# Patient Record
Sex: FEMALE | Race: WHITE | NOT HISPANIC OR LATINO | Employment: PART TIME | URBAN - METROPOLITAN AREA
[De-identification: names, ages, dates, MRNs, and addresses within clinical notes are randomized per-mention and may not be internally consistent; named-entity substitution may affect disease eponyms.]

---

## 2017-03-14 ENCOUNTER — RADIANT APPOINTMENT (OUTPATIENT)
Dept: MAMMOGRAPHY | Facility: CLINIC | Age: 42
End: 2017-03-14
Attending: OBSTETRICS & GYNECOLOGY
Payer: COMMERCIAL

## 2017-03-14 DIAGNOSIS — Z12.31 VISIT FOR SCREENING MAMMOGRAM: ICD-10-CM

## 2017-03-14 PROCEDURE — 77063 BREAST TOMOSYNTHESIS BI: CPT

## 2017-03-14 PROCEDURE — G0202 SCR MAMMO BI INCL CAD: HCPCS

## 2017-07-15 ENCOUNTER — HEALTH MAINTENANCE LETTER (OUTPATIENT)
Age: 42
End: 2017-07-15

## 2018-04-03 ENCOUNTER — RADIANT APPOINTMENT (OUTPATIENT)
Dept: MAMMOGRAPHY | Facility: CLINIC | Age: 43
End: 2018-04-03
Attending: OBSTETRICS & GYNECOLOGY
Payer: COMMERCIAL

## 2018-04-03 DIAGNOSIS — Z12.31 VISIT FOR SCREENING MAMMOGRAM: ICD-10-CM

## 2018-04-03 PROCEDURE — 77067 SCR MAMMO BI INCL CAD: CPT | Performed by: RADIOLOGY

## 2023-04-09 ENCOUNTER — HEALTH MAINTENANCE LETTER (OUTPATIENT)
Age: 48
End: 2023-04-09

## 2023-05-19 ENCOUNTER — TRANSFERRED RECORDS (OUTPATIENT)
Dept: MULTI SPECIALTY CLINIC | Facility: CLINIC | Age: 48
End: 2023-05-19

## 2023-05-22 ENCOUNTER — TRANSFERRED RECORDS (OUTPATIENT)
Dept: HEALTH INFORMATION MANAGEMENT | Facility: CLINIC | Age: 48
End: 2023-05-22

## 2023-08-18 ENCOUNTER — TRANSFERRED RECORDS (OUTPATIENT)
Dept: HEALTH INFORMATION MANAGEMENT | Facility: CLINIC | Age: 48
End: 2023-08-18

## 2023-09-11 ENCOUNTER — OFFICE VISIT (OUTPATIENT)
Dept: FAMILY MEDICINE | Facility: CLINIC | Age: 48
End: 2023-09-11
Payer: COMMERCIAL

## 2023-09-11 VITALS
SYSTOLIC BLOOD PRESSURE: 125 MMHG | HEART RATE: 92 BPM | OXYGEN SATURATION: 98 % | RESPIRATION RATE: 19 BRPM | DIASTOLIC BLOOD PRESSURE: 85 MMHG | WEIGHT: 171.2 LBS | TEMPERATURE: 98 F | BODY MASS INDEX: 27.51 KG/M2 | HEIGHT: 66 IN

## 2023-09-11 DIAGNOSIS — Z11.4 SCREENING FOR HIV (HUMAN IMMUNODEFICIENCY VIRUS): ICD-10-CM

## 2023-09-11 DIAGNOSIS — Z23 NEED FOR PROPHYLACTIC VACCINATION AND INOCULATION AGAINST INFLUENZA: ICD-10-CM

## 2023-09-11 DIAGNOSIS — J40 BRONCHITIS: ICD-10-CM

## 2023-09-11 DIAGNOSIS — Z11.59 NEED FOR HEPATITIS C SCREENING TEST: ICD-10-CM

## 2023-09-11 DIAGNOSIS — Z12.31 VISIT FOR SCREENING MAMMOGRAM: ICD-10-CM

## 2023-09-11 DIAGNOSIS — Z12.11 SCREEN FOR COLON CANCER: ICD-10-CM

## 2023-09-11 DIAGNOSIS — I77.810 DILATATION OF THORACIC AORTA (H): ICD-10-CM

## 2023-09-11 DIAGNOSIS — Z00.00 ROUTINE GENERAL MEDICAL EXAMINATION AT A HEALTH CARE FACILITY: Primary | ICD-10-CM

## 2023-09-11 DIAGNOSIS — I10 ESSENTIAL HYPERTENSION, BENIGN: ICD-10-CM

## 2023-09-11 DIAGNOSIS — Z12.4 CERVICAL CANCER SCREENING: ICD-10-CM

## 2023-09-11 LAB
ALBUMIN SERPL BCG-MCNC: 4.7 G/DL (ref 3.5–5.2)
ALBUMIN UR-MCNC: NEGATIVE MG/DL
ALP SERPL-CCNC: 71 U/L (ref 35–104)
ALT SERPL W P-5'-P-CCNC: 34 U/L (ref 0–50)
ANION GAP SERPL CALCULATED.3IONS-SCNC: 12 MMOL/L (ref 7–15)
APPEARANCE UR: CLEAR
AST SERPL W P-5'-P-CCNC: 25 U/L (ref 0–45)
BACTERIA #/AREA URNS HPF: ABNORMAL /HPF
BILIRUB SERPL-MCNC: 0.6 MG/DL
BILIRUB UR QL STRIP: NEGATIVE
BUN SERPL-MCNC: 16.5 MG/DL (ref 6–20)
CALCIUM SERPL-MCNC: 9.4 MG/DL (ref 8.6–10)
CHLORIDE SERPL-SCNC: 102 MMOL/L (ref 98–107)
CHOLEST SERPL-MCNC: 203 MG/DL
COLOR UR AUTO: YELLOW
CREAT SERPL-MCNC: 0.67 MG/DL (ref 0.51–0.95)
DEPRECATED HCO3 PLAS-SCNC: 24 MMOL/L (ref 22–29)
EGFRCR SERPLBLD CKD-EPI 2021: >90 ML/MIN/1.73M2
ERYTHROCYTE [DISTWIDTH] IN BLOOD BY AUTOMATED COUNT: 13.1 % (ref 10–15)
GLUCOSE SERPL-MCNC: 98 MG/DL (ref 70–99)
GLUCOSE UR STRIP-MCNC: NEGATIVE MG/DL
HBA1C MFR BLD: 5.5 % (ref 0–5.6)
HCT VFR BLD AUTO: 42.2 % (ref 35–47)
HDLC SERPL-MCNC: 72 MG/DL
HGB BLD-MCNC: 14.2 G/DL (ref 11.7–15.7)
HGB UR QL STRIP: ABNORMAL
KETONES UR STRIP-MCNC: NEGATIVE MG/DL
LDLC SERPL CALC-MCNC: 109 MG/DL
LEUKOCYTE ESTERASE UR QL STRIP: NEGATIVE
MCH RBC QN AUTO: 30.7 PG (ref 26.5–33)
MCHC RBC AUTO-ENTMCNC: 33.6 G/DL (ref 31.5–36.5)
MCV RBC AUTO: 91 FL (ref 78–100)
NITRATE UR QL: NEGATIVE
NONHDLC SERPL-MCNC: 131 MG/DL
PH UR STRIP: 5.5 [PH] (ref 5–7)
PLATELET # BLD AUTO: 327 10E3/UL (ref 150–450)
POTASSIUM SERPL-SCNC: 3.8 MMOL/L (ref 3.4–5.3)
PROT SERPL-MCNC: 7.5 G/DL (ref 6.4–8.3)
RBC # BLD AUTO: 4.62 10E6/UL (ref 3.8–5.2)
RBC #/AREA URNS AUTO: ABNORMAL /HPF
SODIUM SERPL-SCNC: 138 MMOL/L (ref 136–145)
SP GR UR STRIP: 1.01 (ref 1–1.03)
SQUAMOUS #/AREA URNS AUTO: ABNORMAL /LPF
TRIGL SERPL-MCNC: 108 MG/DL
UROBILINOGEN UR STRIP-ACNC: 0.2 E.U./DL
WBC # BLD AUTO: 9.6 10E3/UL (ref 4–11)
WBC #/AREA URNS AUTO: ABNORMAL /HPF

## 2023-09-11 PROCEDURE — 99214 OFFICE O/P EST MOD 30 MIN: CPT | Mod: 25 | Performed by: INTERNAL MEDICINE

## 2023-09-11 PROCEDURE — 85027 COMPLETE CBC AUTOMATED: CPT | Performed by: INTERNAL MEDICINE

## 2023-09-11 PROCEDURE — 80053 COMPREHEN METABOLIC PANEL: CPT | Performed by: INTERNAL MEDICINE

## 2023-09-11 PROCEDURE — 83036 HEMOGLOBIN GLYCOSYLATED A1C: CPT | Performed by: INTERNAL MEDICINE

## 2023-09-11 PROCEDURE — 81001 URINALYSIS AUTO W/SCOPE: CPT | Performed by: INTERNAL MEDICINE

## 2023-09-11 PROCEDURE — 90682 RIV4 VACC RECOMBINANT DNA IM: CPT | Performed by: INTERNAL MEDICINE

## 2023-09-11 PROCEDURE — 80061 LIPID PANEL: CPT | Performed by: INTERNAL MEDICINE

## 2023-09-11 PROCEDURE — 36415 COLL VENOUS BLD VENIPUNCTURE: CPT | Performed by: INTERNAL MEDICINE

## 2023-09-11 PROCEDURE — 99386 PREV VISIT NEW AGE 40-64: CPT | Mod: 25 | Performed by: INTERNAL MEDICINE

## 2023-09-11 PROCEDURE — 90471 IMMUNIZATION ADMIN: CPT | Performed by: INTERNAL MEDICINE

## 2023-09-11 RX ORDER — IRBESARTAN 300 MG/1
300 TABLET ORAL DAILY
Qty: 90 TABLET | Refills: 3 | Status: SHIPPED | OUTPATIENT
Start: 2023-09-11 | End: 2024-06-14

## 2023-09-11 RX ORDER — FLUTICASONE FUROATE 100 UG/1
1 POWDER RESPIRATORY (INHALATION) DAILY
Qty: 30 EACH | Refills: 11 | Status: SHIPPED | OUTPATIENT
Start: 2023-09-11

## 2023-09-11 RX ORDER — IRBESARTAN 300 MG/1
300 TABLET ORAL
COMMUNITY
Start: 2022-11-30 | End: 2023-09-11

## 2023-09-11 RX ORDER — SPIRONOLACTONE 25 MG/1
1 TABLET ORAL DAILY
COMMUNITY
Start: 2023-07-24 | End: 2023-09-11

## 2023-09-11 RX ORDER — ALBUTEROL SULFATE 90 UG/1
2 AEROSOL, METERED RESPIRATORY (INHALATION) EVERY 6 HOURS PRN
Qty: 18 G | Refills: 11 | Status: SHIPPED | OUTPATIENT
Start: 2023-09-11

## 2023-09-11 RX ORDER — NIFEDIPINE 60 MG/1
60 TABLET, EXTENDED RELEASE ORAL DAILY
Qty: 90 TABLET | Refills: 3 | Status: SHIPPED | OUTPATIENT
Start: 2023-09-11 | End: 2023-10-27

## 2023-09-11 RX ORDER — SPIRONOLACTONE 25 MG/1
25 TABLET ORAL DAILY
Qty: 90 TABLET | Refills: 3 | Status: SHIPPED | OUTPATIENT
Start: 2023-09-11 | End: 2024-06-14

## 2023-09-11 ASSESSMENT — ENCOUNTER SYMPTOMS
HEMATURIA: 0
MYALGIAS: 1
HEADACHES: 0
JOINT SWELLING: 1
ARTHRALGIAS: 1
EYE PAIN: 0
BREAST MASS: 0
NAUSEA: 0
DIARRHEA: 0
SORE THROAT: 0
FREQUENCY: 0
DIZZINESS: 0
DYSURIA: 0
COUGH: 0
CHILLS: 0
SHORTNESS OF BREATH: 1
ABDOMINAL PAIN: 0
WEAKNESS: 0
HEMATOCHEZIA: 0
NERVOUS/ANXIOUS: 0
HEARTBURN: 0
FEVER: 0
CONSTIPATION: 0
PALPITATIONS: 1
PARESTHESIAS: 0

## 2023-09-11 ASSESSMENT — PAIN SCALES - GENERAL: PAINLEVEL: NO PAIN (0)

## 2023-09-11 NOTE — PATIENT INSTRUCTIONS
(Z00.00) Routine general medical examination at a health care facility  (primary encounter diagnosis)  Comment: For routine exam, we will draw labs as ordered, cholesterol, diabetes mellitus check, liver function, renal function, and request mammogram -  [facility] (also performs diagnostic mammogram, ultrasound and biopsy) [phone].   We will also update vaccination history.  Plan: Lipid panel reflex to direct LDL Non-fasting,         Lipid panel reflex to direct LDL Fasting,         Comprehensive metabolic panel, CBC with         platelets, Hemoglobin A1c, UA Macroscopic with         reflex to Microscopic and Culture            (Z12.11) Screen for colon cancer  Comment: up to date for colonoscopy  Plan:     (Z11.4) Screening for HIV (human immunodeficiency virus)  Comment: declined  Plan:     (Z11.59) Need for hepatitis C screening test  Comment: declined  Plan:     (Z12.4) Cervical cancer screening  Comment: up to date - [facility]  Plan:     (Z12.31) Visit for screening mammogram  Comment:  [facility] (also performs diagnostic mammogram, ultrasound and biopsy) [phone].   Plan: MA SCREENING DIGITAL BILAT - Future  (s+30)            (J40) Bronchitis  Comment: OK to refill albuterol and will also refill fluticasone  Plan: fluticasone (ARNUITY ELLIPTA) 100 MCG/ACT         inhaler, albuterol (PROAIR HFA/PROVENTIL         HFA/VENTOLIN HFA) 108 (90 Base) MCG/ACT inhaler            (I77.810) Dilatation of thoracic aorta (H)  Comment: Recommend annual echocardiogram [facility] - [phone] and consult with cardiology also placed   Plan: Echocardiogram Complete, Adult Cardiology Irene Pereira Referral            (I10) Essential hypertension, benign  Comment: blood pressure is excellent, but given lower extremity edema we can consider reducing dose of nifedipine.  Alternatively prescription for compression stockings could be offered   Plan:  NIFEdipine ER OSMOTIC (NIFEDICAL XL) 60 MG 24         hr tablet, irbesartan (AVAPRO) 300 MG tablet,         spironolactone (ALDACTONE) 25 MG tablet

## 2023-09-11 NOTE — Clinical Note
Please abstract the following data from this visit with this patient into the appropriate field in Epic:  Tests that can be patient reported without a hard copy:  Colonoscopy done on this date: 5/19/2023 (approximately), by this group: [facility],   Other Tests found in the patient's chart through Chart Review/Care Everywhere:

## 2023-09-11 NOTE — PROGRESS NOTES
SUBJECTIVE:   CC: Nannette is an 48 year old who presents for preventive health visit.       Healthy Habits:     Getting at least 3 servings of Calcium per day:  Yes    Bi-annual eye exam:  NO    Dental care twice a year:  Yes    Sleep apnea or symptoms of sleep apnea:  None    Diet:  Regular (no restrictions), Low salt and Carbohydrate counting    Frequency of exercise:  4-5 days/week    Duration of exercise:  30-45 minutes    Taking medications regularly:  Yes    Medication side effects:  None    Additional concerns today:  No      Today's PHQ-2 Score:       9/11/2023    10:39 AM   PHQ-2 ( 1999 [employer])   Q1: Little interest or pleasure in doing things 0   Q2: Feeling down, depressed or hopeless 0   PHQ-2 Score 0   Q1: Little interest or pleasure in doing things Not at all   Q2: Feeling down, depressed or hopeless Not at all   PHQ-2 Score 0         Have you ever done Advance Care Planning? (For example, a Health Directive, POLST, or a discussion with a medical provider or your loved ones about your wishes): No, advance care planning information given to patient to review.  Patient plans to discuss their wishes with loved ones or provider.      Social History     Tobacco Use    Smoking status: Never    Smokeless tobacco: Not on file   Substance Use Topics    Alcohol use: No             9/11/2023    10:39 AM   Alcohol Use   Prescreen: >3 drinks/day or >7 drinks/week? No          No data to display              Reviewed orders with patient.  Reviewed health maintenance and updated orders accordingly - Yes  Lab work is in process  Labs reviewed in EPIC    Breast Cancer Screening:    FHS-7:       9/11/2023    10:39 AM   Breast CA Risk Assessment (FHS-7)   Did any of your first-degree relatives have breast or ovarian cancer? Yes   Did any of your relatives have bilateral breast cancer? No   Did any man in your family have breast cancer? No   Did any woman in your family have breast and ovarian cancer? Yes   Did any woman  in your family have breast cancer before age 50 y? No   Do you have 2 or more relatives with breast and/or ovarian cancer? No   Do you have 2 or more relatives with breast and/or bowel cancer? No       Pertinent mammograms are reviewed under the imaging tab.    History of abnormal Pap smear: Status post benign hysterectomy. Health Maintenance and Surgical History updated.     Reviewed and updated as needed this visit by clinical staff                  Reviewed and updated as needed this visit by Provider                 No past medical history on file.     Review of Systems   Constitutional:  Negative for chills and fever.   HENT:  Negative for congestion, ear pain, hearing loss and sore throat.    Eyes:  Positive for visual disturbance. Negative for pain.   Respiratory:  Positive for shortness of breath. Negative for cough.    Cardiovascular:  Positive for palpitations and peripheral edema. Negative for chest pain.   Gastrointestinal:  Negative for abdominal pain, constipation, diarrhea, heartburn, hematochezia and nausea.   Breasts:  Positive for tenderness. Negative for breast mass and discharge.   Genitourinary:  Negative for dysuria, frequency, genital sores, hematuria, pelvic pain, urgency, vaginal bleeding and vaginal discharge.   Musculoskeletal:  Positive for arthralgias, joint swelling and myalgias.   Skin:  Negative for rash.   Neurological:  Negative for dizziness, weakness, headaches and paresthesias.   Psychiatric/Behavioral:  Positive for mood changes. The patient is not nervous/anxious.      Ankle edema    Nannette Lisa has been noticing periodic ankle edema with associated discomfort.  Noticed for the past 3-4 months has worsened.  Noticed when she is seated all day.  She did have a recent echocardiogram showing stable cardiac function with mild dilation of the ascending aorta.  She has a family history of this as well.  She has had previous cardiac work-up which has been negative to date.  Screen  "for colon cancer   She is up-to-date with regards to colonoscopy  Screening for HIV (human immunodeficiency virus)  Need for hepatitis C screening test   She declines HIV and hep C screening  Cervical cancer screening   Has had hysterectomy and is no longer due for Pap smear.  Recent follow-up with gynecology  Visit for screening mammogram   Due for mammogram  Bronchitis   She does have known history of asthma and bronchitis.  She has been taking Flovent with some success in managing.  She also has albuterol as needed.  Dilatation of thoracic aorta (H)   As above, history of dilated aorta  Essential hypertension, benign   As above.  Blood pressure has been well controlled with addition of nifedipine.  She wishes to continue this medication with irbesartan and spironolactone.       OBJECTIVE:   /85 (BP Location: Right arm, Patient Position: Sitting, Cuff Size: Adult Regular)   Pulse 92   Temp 98  F (36.7  C) (Oral)   Resp 19   Ht 1.665 m (5' 5.55\")   Wt 77.7 kg (171 lb 3.2 oz)   SpO2 98%   BMI 28.01 kg/m    Physical Exam  GENERAL: healthy, alert and no distress  EYES: Eyes grossly normal to inspection,    HENT: ear canals and TM's normal, nose and mouth without ulcers or lesions  NECK: no adenopathy, no asymmetry, masses, or scars and thyroid normal to palpation  RESP: lungs clear to auscultation - no rales, rhonchi or wheezes  CV: regular rate and rhythm, normal S1 S2, no S3 or S4, no murmur, click or rub, trace peripheral edema    ABDOMEN: soft, nontender, no hepatosplenomegaly, no masses and bowel sounds normal  MS: no gross musculoskeletal defects noted, no edema  SKIN: no suspicious lesions or rashes  NEURO: Normal strength and tone, mentation intact and speech normal  PSYCH: mentation appears normal, affect normal/bright    Diagnostic Test Results:  Labs reviewed in Epic    ASSESSMENT/PLAN:     Patient Instructions   (Z00.00) Routine general medical examination at a health care facility  ([facility] " encounter diagnosis)  Comment: For routine exam, we will draw labs as ordered, cholesterol, diabetes mellitus check, liver function, renal function, and request mammogram -  [facility] (also performs diagnostic mammogram, ultrasound and biopsy) [phone].   We will also update vaccination history.  Plan: Lipid panel reflex to direct LDL Non-fasting,         Lipid panel reflex to direct LDL Fasting,         Comprehensive metabolic panel, CBC with         platelets, Hemoglobin A1c, UA Macroscopic with         reflex to Microscopic and Culture            (Z12.11) Screen for colon cancer  Comment: up to date for colonoscopy  Plan:     (Z11.4) Screening for HIV (human immunodeficiency virus)  Comment: declined  Plan:     (Z11.59) Need for hepatitis C screening test  Comment: declined  Plan:     (Z12.4) Cervical cancer screening  Comment: up to date - [facility]  Plan:     (Z12.31) Visit for screening mammogram  Comment:  [facility] (also performs diagnostic mammogram, ultrasound and biopsy) [phone].   Plan: MA SCREENING DIGITAL BILAT - Future  (s+30)            (J40) Bronchitis  Comment: OK to refill albuterol and will also refill fluticasone  Plan: fluticasone (ARNUITY ELLIPTA) 100 MCG/ACT         inhaler, albuterol (PROAIR HFA/PROVENTIL         HFA/VENTOLIN HFA) 108 (90 Base) MCG/ACT inhaler            (I77.810) Dilatation of thoracic aorta (H)  Comment: Recommend annual echocardiogram [facility] - [phone] and consult with cardiology also placed   Plan: Echocardiogram Complete, Adult Cardiology Irene Pereira Referral            (I10) Essential hypertension, benign  Comment: blood pressure is excellent, but given lower extremity edema we can consider reducing dose of nifedipine.  Alternatively prescription for compression stockings could be offered   Plan: NIFEdipine ER OSMOTIC (NIFEDICAL XL) 60 MG 24         hr tablet, irbesartan (AVAPRO)  300 MG tablet,         spironolactone (ALDACTONE) 25 MG tablet                Patient has been advised of split billing requirements and indicates understanding: Yes      COUNSELING:  Reviewed preventive health counseling, as reflected in patient instructions        She reports that she has never smoked. She does not have any smokeless tobacco history on file.          Emerson Carranza MD, MD  [facility]

## 2023-09-13 NOTE — RESULT ENCOUNTER NOTE
Tee Brunson,    I had the opportunity to review your recent labs and a summary of your labs reads as follows:    Your complete blood counts show no sign of anemia, normal white blood cell count and platelets.  Your comprehensive metabolic panel showed normal renal function, normal liver function, and normal fasting blood glucose indicating no evidence of diabetes mellitus.  Your A1c also shows excellent average blood glucose  Your fasting lipid panel show  - normal HDL (good) cholesterol -as your goal is greater than 40  - low LDL (bad) cholesterol as your goal is less than 130  - normal triglyceride levels  Your urinalysis shows moderate blood, and there are many possible explanations for this, I recommend repeating your urinalysis at your convenience    The 10-year ASCVD risk score (Miguel DK, et al., 2019) is: 1.2%    Values used to calculate the score:      Age: 48 years      Sex: Female      Is Non- : No      Diabetic: No      Tobacco smoker: No      Systolic Blood Pressure: 125 mmHg      Is BP treated: Yes      HDL Cholesterol: 72 mg/dL      Total Cholesterol: 203 mg/dL      Sincerely,  Emerson Carranza MD

## 2023-09-22 ENCOUNTER — LAB (OUTPATIENT)
Dept: LAB | Facility: CLINIC | Age: 48
End: 2023-09-22
Payer: COMMERCIAL

## 2023-09-22 DIAGNOSIS — Z23 NEED FOR PROPHYLACTIC VACCINATION AND INOCULATION AGAINST INFLUENZA: ICD-10-CM

## 2023-09-22 DIAGNOSIS — I10 ESSENTIAL HYPERTENSION, BENIGN: ICD-10-CM

## 2023-09-22 DIAGNOSIS — Z11.4 SCREENING FOR HIV (HUMAN IMMUNODEFICIENCY VIRUS): ICD-10-CM

## 2023-09-22 DIAGNOSIS — Z12.11 SCREEN FOR COLON CANCER: ICD-10-CM

## 2023-09-22 DIAGNOSIS — Z11.59 NEED FOR HEPATITIS C SCREENING TEST: ICD-10-CM

## 2023-09-22 DIAGNOSIS — Z12.4 CERVICAL CANCER SCREENING: ICD-10-CM

## 2023-09-22 DIAGNOSIS — Z12.31 VISIT FOR SCREENING MAMMOGRAM: ICD-10-CM

## 2023-09-22 DIAGNOSIS — I77.810 DILATATION OF THORACIC AORTA (H): ICD-10-CM

## 2023-09-22 DIAGNOSIS — J40 BRONCHITIS: ICD-10-CM

## 2023-09-22 DIAGNOSIS — Z00.00 ROUTINE GENERAL MEDICAL EXAMINATION AT A HEALTH CARE FACILITY: ICD-10-CM

## 2023-09-22 LAB
ALBUMIN UR-MCNC: NEGATIVE MG/DL
APPEARANCE UR: CLEAR
BACTERIA #/AREA URNS HPF: ABNORMAL /HPF
BILIRUB UR QL STRIP: NEGATIVE
COLOR UR AUTO: YELLOW
GLUCOSE UR STRIP-MCNC: NEGATIVE MG/DL
HGB UR QL STRIP: ABNORMAL
KETONES UR STRIP-MCNC: NEGATIVE MG/DL
LEUKOCYTE ESTERASE UR QL STRIP: NEGATIVE
NITRATE UR QL: NEGATIVE
PH UR STRIP: 7 [PH] (ref 5–7)
RBC #/AREA URNS AUTO: ABNORMAL /HPF
SP GR UR STRIP: 1.01 (ref 1–1.03)
SQUAMOUS #/AREA URNS AUTO: ABNORMAL /LPF
UROBILINOGEN UR STRIP-ACNC: 0.2 E.U./DL
WBC #/AREA URNS AUTO: ABNORMAL /HPF

## 2023-09-22 PROCEDURE — 81001 URINALYSIS AUTO W/SCOPE: CPT

## 2023-09-22 NOTE — RESULT ENCOUNTER NOTE
Tee Brunson,    I have had the opportunity to review your recent results and an interpretation is as follows:  Your recheck of your your urinalysis again shows no concerning findings for infection but still with a small amount of blood.  I recommend we consider a consultation with urology to review this        [facility] [address]  [address]  [facility] [address]  Phone: [phone]  Fax: [phone]          Sincerely,  Emerson Carranza MD

## 2023-10-27 ENCOUNTER — OFFICE VISIT (OUTPATIENT)
Dept: CARDIOLOGY | Facility: CLINIC | Age: 48
End: 2023-10-27
Payer: COMMERCIAL

## 2023-10-27 VITALS
SYSTOLIC BLOOD PRESSURE: 136 MMHG | DIASTOLIC BLOOD PRESSURE: 80 MMHG | HEART RATE: 80 BPM | OXYGEN SATURATION: 97 % | BODY MASS INDEX: 28.49 KG/M2 | WEIGHT: 171 LBS | HEIGHT: 65 IN

## 2023-10-27 DIAGNOSIS — I77.810 DILATATION OF THORACIC AORTA (H): ICD-10-CM

## 2023-10-27 DIAGNOSIS — E78.5 DYSLIPIDEMIA: ICD-10-CM

## 2023-10-27 DIAGNOSIS — I1A.0 RESISTANT HYPERTENSION: Primary | ICD-10-CM

## 2023-10-27 PROCEDURE — 99204 OFFICE O/P NEW MOD 45 MIN: CPT | Performed by: INTERNAL MEDICINE

## 2023-10-27 PROCEDURE — 93000 ELECTROCARDIOGRAM COMPLETE: CPT | Performed by: INTERNAL MEDICINE

## 2023-10-27 RX ORDER — AMLODIPINE BESYLATE 10 MG/1
1 TABLET ORAL DAILY
COMMUNITY
Start: 2023-10-06 | End: 2024-06-14

## 2023-10-27 NOTE — PATIENT INSTRUCTIONS
October 27, 2023    Thank you for allowing our Cardiology team to participate in your care.     Please note the following changes to your heart treatment plan:     Medication changes:   - none  - monitor BPs at home, call our team if BPs are consistently >130/80 mmHg    Tests to be done:  - TTE (heart ultrasound) in 1 year  - Added renal artery duplex ultrasounds   - FASTING labs in 1 year    Follow up:  - Follow up in 1 year, or sooner as needed.      For scheduling, please call [phone].      Please contact our team through [employer] or our Nurse Team Voicemail service [phone], or the General Clinic [phone] for any questions or concerns.     If you are having a medical emergency, please call [phone].     Sincerely,    Simón Hampton MD, [facility]  Cardiology    [facility] [place]

## 2023-10-27 NOTE — LETTER
10/27/2023    Robin Guerrero MD  [address] [facility]  [facility] [address]    RE: Nannette Lisa       Dear Colleague,     I had the pleasure of seeing Nannette Lisa in the [facility].      Cardiology Clinic Consultation:    October 27, 2023   Patient Name: Nannette Lisa  Patient MRN: [id number]    Consult indication: dilated aorta    HPI:    I had the opportunity to see patient Nannette Lisa in cardiology clinic for a consultation.     As you know patient is a pleasant 48-year-old female with a past medical history significant for hypertension, asthma, family history of early ASCVD, family history of aortic aneurysm, who presents to [facility] care.    Patient reports that she has had elevated blood pressures dating back to her 20s.  She had been evaluated extensively in the past, details unclear, however she reports that evaluation had been unrevealing.  She does note that she has a strong family history of hypertension.  At baseline she exercises regularly, walks for a couple of miles every day, also engages in light weight lifting.  Denies any chest pain, chest pressure, abnormal shortness of breath.  Regarding family history, her father passed away from complications related to cancer, however had an MI in his 40s, bypass surgery in his 50s.  Her mother has a history of aortic aneurysm, they have been watching this closely with repeat MRA studies every 6 months.  Patient has a longstanding history of challenging to control hypertension, she had been doing well on nifedipine, irbesartan, spironolactone however developed lower extremity swelling.  Nifedipine was then switched to amlodipine.  Since then, blood pressures have been well controlled, she brings with her a log from BP readings at home, numbers are generally in the 120s over 70s mmHg range.    Patient does not smoke cigarettes, does not drink alcohol regularly, does not drink an excessive amount of  caffeine.  Diet is well-rounded, she does not add an excessive amount of salt however does not avoid it either.    She had been experiencing some palpitations and so was assessed through [facility] with a Zio patch monitor that demonstrated sinus rhythm with only rare/occasional ectopy, no significant arrhythmia.  TTE 8/20/2023 reported LVEF 65 to 70%, normal RV function, proximal ascending aorta 4 cm.  CT coronary calcium score 8/13/2019 was 0.    Assessment and Plan/Recommendations:    # Hypertension, longstanding. BP well controlled on current regimen.   # Mildly dilated ascending aorta 4cm on OSH TTE 8/2023  # CAC 0 8/13/2019  # FH of ascending aortic aneurysm  # FH of early ASCVD    -Overall patient is in stable cardiac health without symptoms concerning for angina or decompensated heart failure  - Encouraged continued healthy lifestyle habits including regular aerobic exercise, light weightlifting (avoiding heavy weightlifting due to concern for dilated aorta), heart healthy Mediterranean type diet incorporating olive oil, fiber, lean protein  - Continue current regimen of amlodipine, irbesartan, spironolactone  - Advised to continue monitoring BPs at home, if BPs are consistently over goal of less than 130/80 mmHg, plan will be to start beta-blocker therapy (previously had been on atenolol and this was well-tolerated)  - We will add on renal artery Doppler study to renal ultrasound that has been ordered for work-up of hematuria  - TTE in 1 year  - Fasting lipids and BMP in 1 year with follow-up at that time, or sooner as needed    Thank you for allowing our team to participate in the care of Nannette Lisa.  Please do not hesitate to call or page me with any questions or concerns.    Sincerely,     Simón Hampton MD, [facility]  Cardiology  October 27, 2023    cc  Emerson Carranza MD  [address]  [place] [address]      Total time spent on this encounter today: Greater than  "45 minutes, providing care in this encounter including, but not limited to, reviewing prior medical records, laboratory data, imaging studies, diagnostic studies, procedure notes, formulating an assessment and plan, recommendations, discussion and counseling with patient face to face, dictation.    Past Medical History:     The 10-year ASCVD risk score (Miguel YAO, et al., 2019) is: 1.4%    Values used to calculate the score:      Age: 48 years      Sex: Female      Is Non- : No      Diabetic: No      Tobacco smoker: No      Systolic Blood Pressure: 136 mmHg      Is BP treated: Yes      HDL Cholesterol: 72 mg/dL      Total Cholesterol: 203 mg/dL  Patient Active Problem List   Diagnosis    Dilatation of thoracic aorta (H24)    Essential hypertension, benign    Bronchitis    Asthma    FH: CAD (coronary artery disease)    Lung nodule       Past Surgical History:   Past Surgical History:   Procedure Laterality Date    HYSTERECTOMY, PAP NO LONGER INDICATED         Medications (outpatient):  Current Outpatient Medications   Medication Sig Dispense Refill    albuterol (PROAIR HFA/PROVENTIL HFA/VENTOLIN HFA) 108 (90 Base) MCG/ACT inhaler Inhale 2 puffs into the lungs every 6 hours as needed for shortness of breath, wheezing or cough 18 g 11    amLODIPine (NORVASC) 10 MG tablet Take 1 tablet by mouth daily      fluticasone (ARNUITY ELLIPTA) 100 MCG/ACT inhaler Inhale 1 puff into the lungs daily 30 each 11    irbesartan (AVAPRO) 300 MG tablet Take 1 tablet (300 mg) by mouth daily 90 tablet 3    omeprazole (PRILOSEC) 20 MG DR capsule Take 20 mg by mouth daily      spironolactone (ALDACTONE) 25 MG tablet Take 1 tablet (25 mg) by mouth daily 90 tablet 3       Allergies:  Allergies   Allergen Reactions    Amoxicillin-Pot Clavulanate Diarrhea    Beta Adrenergic Blockers      Fatigue at higher doses    Diltiazem Dizziness     vertigo    Lisinopril      \"felt funny\"       Social History:   History   Drug Use " "Unknown      History   Smoking Status    Never   Smokeless Tobacco    Never     Social History    Substance and Sexual Activity      Alcohol use: No       Family History:  Family History   Problem Relation Age of Onset    Aortic aneurysm Mother     Atrial fibrillation Mother     Ulcerative Colitis Mother     Coronary Artery Disease Father     Cancer Father        Review of Systems:   A comprehensive 12 system review of systems was carried out.  Pertinent positives and negatives are noted above. Otherwise negative for contributory information.    Objective & Physical Exam:  /80 (BP Location: Right arm, Patient Position: Sitting, Cuff Size: Adult Regular)   Pulse 80   Ht 1.651 m (5' 5\")   Wt 77.6 kg (171 lb)   SpO2 97%   BMI 28.46 kg/m    Wt Readings from Last 2 Encounters:   10/27/23 77.6 kg (171 lb)   09/11/23 77.7 kg (171 lb 3.2 oz)     Body mass index is 28.46 kg/m .   Body surface area is 1.89 meters squared.    Constitutional: appears stated age, in no apparent distress, appears to be well nourished  Head: normocephalic, atraumatic  Neck: supple, trachea midline  Pulmonary: clear to auscultation bilaterally, no wheezes, no rales, no increased work of breathing  Cardiovascular: JVP normal, regular rate, regular rhythm, normal S1 and S2, no S3, S4, no murmur appreciated, no lower extremity edema  Gastrointestinal: no guarding, non-rigid   Neurologic: awake, alert, moves all extremities  Skin: no jaundice, warm on limited exam  Psychiatric: affect is normal, answers questions appropriately, oriented to self and place    Data reviewed:  Lab Results   Component Value Date    WBC 9.6 09/11/2023    RBC 4.62 09/11/2023    HGB 14.2 09/11/2023    HCT 42.2 09/11/2023    MCV 91 09/11/2023    MCH 30.7 09/11/2023    MCHC 33.6 09/11/2023    RDW 13.1 09/11/2023     09/11/2023     Sodium   Date Value Ref Range Status   09/11/2023 138 136 - 145 mmol/L Final     Potassium   Date Value Ref Range Status "   09/11/2023 3.8 3.4 - 5.3 mmol/L Final     Chloride   Date Value Ref Range Status   09/11/2023 102 98 - 107 mmol/L Final     Carbon Dioxide (CO2)   Date Value Ref Range Status   09/11/2023 24 22 - 29 mmol/L Final     Anion Gap   Date Value Ref Range Status   09/11/2023 12 7 - 15 mmol/L Final     Glucose   Date Value Ref Range Status   09/11/2023 98 70 - 99 mg/dL Final     Urea Nitrogen   Date Value Ref Range Status   09/11/2023 16.5 6.0 - 20.0 mg/dL Final     Creatinine   Date Value Ref Range Status   09/11/2023 0.67 0.51 - 0.95 mg/dL Final     GFR Estimate   Date Value Ref Range Status   09/11/2023 >90 >60 mL/min/1.73m2 Final     Calcium   Date Value Ref Range Status   09/11/2023 9.4 8.6 - 10.0 mg/dL Final     Bilirubin Total   Date Value Ref Range Status   09/11/2023 0.6 <=1.2 mg/dL Final     Alkaline Phosphatase   Date Value Ref Range Status   09/11/2023 71 35 - 104 U/L Final     ALT   Date Value Ref Range Status   09/11/2023 34 0 - 50 U/L Final     Comment:     Reference intervals for this test were updated on 6/12/2023 to more accurately reflect our healthy population. There may be differences in the flagging of prior results with similar values performed with this method. Interpretation of those prior results can be made in the context of the updated reference intervals.       AST   Date Value Ref Range Status   09/11/2023 25 0 - 45 U/L Final     Comment:     Reference intervals for this test were updated on 6/12/2023 to more accurately reflect our healthy population. There may be differences in the flagging of prior results with similar values performed with this method. Interpretation of those prior results can be made in the context of the updated reference intervals.     Recent Labs   Lab Test 09/11/23  1211   CHOL 203*   HDL 72   *   TRIG 108      Lab Results   Component Value Date    A1C 5.5 09/11/2023          Thank you for allowing me to participate in the care of your patient.      Sincerely,      Simón Hampton MD     [facility]  cc:   Emerson Carranza MD  [address] [address]

## 2023-10-27 NOTE — PROGRESS NOTES
Cardiology Clinic Consultation:    October 27, 2023   Patient Name: Nannette Lisa  Patient MRN: [id number]    Consult indication: dilated aorta    HPI:    I had the opportunity to see patient Nannette Lisa in cardiology clinic for a consultation.     As you know patient is a pleasant 48-year-old female with a past medical history significant for hypertension, asthma, family history of early ASCVD, family history of aortic aneurysm, who presents to [facility] care.    Patient reports that she has had elevated blood pressures dating back to her 20s.  She had been evaluated extensively in the past, details unclear, however she reports that evaluation had been unrevealing.  She does note that she has a strong family history of hypertension.  At baseline she exercises regularly, walks for a couple of miles every day, also engages in light weight lifting.  Denies any chest pain, chest pressure, abnormal shortness of breath.  Regarding family history, her father passed away from complications related to cancer, however had an MI in his 40s, bypass surgery in his 50s.  Her mother has a history of aortic aneurysm, they have been watching this closely with repeat MRA studies every 6 months.  Patient has a longstanding history of challenging to control hypertension, she had been doing well on nifedipine, irbesartan, spironolactone however developed lower extremity swelling.  Nifedipine was then switched to amlodipine.  Since then, blood pressures have been well controlled, she brings with her a log from BP readings at home, numbers are generally in the 120s over 70s mmHg range.    Patient does not smoke cigarettes, does not drink alcohol regularly, does not drink an excessive amount of caffeine.  Diet is well-rounded, she does not add an excessive amount of salt however does not avoid it either.    She had been experiencing some palpitations and so was assessed through [facility] with a Zio patch monitor that  demonstrated sinus rhythm with only rare/occasional ectopy, no significant arrhythmia.  TTE 8/20/2023 reported LVEF 65 to 70%, normal RV function, proximal ascending aorta 4 cm.  CT coronary calcium score 8/13/2019 was 0.    Assessment and Plan/Recommendations:    # Hypertension, longstanding. BP well controlled on current regimen.   # Mildly dilated ascending aorta 4cm on OSH TTE 8/2023  # CAC 0 8/13/2019  # FH of ascending aortic aneurysm  # FH of early ASCVD    -Overall patient is in stable cardiac health without symptoms concerning for angina or decompensated heart failure  - Encouraged continued healthy lifestyle habits including regular aerobic exercise, light weightlifting (avoiding heavy weightlifting due to concern for dilated aorta), heart healthy Mediterranean type diet incorporating olive oil, fiber, lean protein  - Continue current regimen of amlodipine, irbesartan, spironolactone  - Advised to continue monitoring BPs at home, if BPs are consistently over goal of less than 130/80 mmHg, plan will be to start beta-blocker therapy (previously had been on atenolol and this was well-tolerated)  - We will add on renal artery Doppler study to renal ultrasound that has been ordered for work-up of hematuria  - TTE in 1 year  - Fasting lipids and BMP in 1 year with follow-up at that time, or sooner as needed    Thank you for allowing our team to participate in the care of Nannette Lisa.  Please do not hesitate to call or page me with any questions or concerns.    Sincerely,     Simón Hampton MD, [facility]  Cardiology  October 27, 2023      Emerson Carranza MD  [address] [address]      Total time spent on this encounter today: Greater than 45 minutes, providing care in this encounter including, but not limited to, reviewing prior medical records, laboratory data, imaging studies, diagnostic studies, procedure notes, formulating an assessment and plan, recommendations,  "discussion and counseling with patient face to face, dictation.    Past Medical History:     The 10-year ASCVD risk score (Miguel YAO, et al., 2019) is: 1.4%    Values used to calculate the score:      Age: 48 years      Sex: Female      Is Non- : No      Diabetic: No      Tobacco smoker: No      Systolic Blood Pressure: 136 mmHg      Is BP treated: Yes      HDL Cholesterol: 72 mg/dL      Total Cholesterol: 203 mg/dL  Patient Active Problem List   Diagnosis    Dilatation of thoracic aorta (H24)    Essential hypertension, benign    Bronchitis    Asthma    FH: CAD (coronary artery disease)    Lung nodule       Past Surgical History:   Past Surgical History:   Procedure Laterality Date    HYSTERECTOMY, PAP NO LONGER INDICATED         Medications (outpatient):  Current Outpatient Medications   Medication Sig Dispense Refill    albuterol (PROAIR HFA/PROVENTIL HFA/VENTOLIN HFA) 108 (90 Base) MCG/ACT inhaler Inhale 2 puffs into the lungs every 6 hours as needed for shortness of breath, wheezing or cough 18 g 11    amLODIPine (NORVASC) 10 MG tablet Take 1 tablet by mouth daily      fluticasone (ARNUITY ELLIPTA) 100 MCG/ACT inhaler Inhale 1 puff into the lungs daily 30 each 11    irbesartan (AVAPRO) 300 MG tablet Take 1 tablet (300 mg) by mouth daily 90 tablet 3    omeprazole (PRILOSEC) 20 MG DR capsule Take 20 mg by mouth daily      spironolactone (ALDACTONE) 25 MG tablet Take 1 tablet (25 mg) by mouth daily 90 tablet 3       Allergies:  Allergies   Allergen Reactions    Amoxicillin-Pot Clavulanate Diarrhea    Beta Adrenergic Blockers      Fatigue at higher doses    Diltiazem Dizziness     vertigo    Lisinopril      \"felt funny\"       Social History:   History   Drug Use Unknown      History   Smoking Status    Never   Smokeless Tobacco    Never     Social History    Substance and Sexual Activity      Alcohol use: No       Family History:  Family History   Problem Relation Age of Onset    Aortic " "aneurysm Mother     Atrial fibrillation Mother     Ulcerative Colitis Mother     Coronary Artery Disease Father     Cancer Father        Review of Systems:   A comprehensive 12 system review of systems was carried out.  Pertinent positives and negatives are noted above. Otherwise negative for contributory information.    Objective & Physical Exam:  /80 (BP Location: Right arm, Patient Position: Sitting, Cuff Size: Adult Regular)   Pulse 80   Ht 1.651 m (5' 5\")   Wt 77.6 kg (171 lb)   SpO2 97%   BMI 28.46 kg/m    Wt Readings from Last 2 Encounters:   10/27/23 77.6 kg (171 lb)   09/11/23 77.7 kg (171 lb 3.2 oz)     Body mass index is 28.46 kg/m .   Body surface area is 1.89 meters squared.    Constitutional: appears stated age, in no apparent distress, appears to be well nourished  Head: normocephalic, atraumatic  Neck: supple, trachea midline  Pulmonary: clear to auscultation bilaterally, no wheezes, no rales, no increased work of breathing  Cardiovascular: JVP normal, regular rate, regular rhythm, normal S1 and S2, no S3, S4, no murmur appreciated, no lower extremity edema  Gastrointestinal: no guarding, non-rigid   Neurologic: awake, alert, moves all extremities  Skin: no jaundice, warm on limited exam  Psychiatric: affect is normal, answers questions appropriately, oriented to self and place    Data reviewed:  Lab Results   Component Value Date    WBC 9.6 09/11/2023    RBC 4.62 09/11/2023    HGB 14.2 09/11/2023    HCT 42.2 09/11/2023    MCV 91 09/11/2023    MCH 30.7 09/11/2023    MCHC 33.6 09/11/2023    RDW 13.1 09/11/2023     09/11/2023     Sodium   Date Value Ref Range Status   09/11/2023 138 136 - 145 mmol/L Final     Potassium   Date Value Ref Range Status   09/11/2023 3.8 3.4 - 5.3 mmol/L Final     Chloride   Date Value Ref Range Status   09/11/2023 102 98 - 107 mmol/L Final     Carbon Dioxide (CO2)   Date Value Ref Range Status   09/11/2023 24 22 - 29 mmol/L Final     Anion Gap   Date Value " Ref Range Status   09/11/2023 12 7 - 15 mmol/L Final     Glucose   Date Value Ref Range Status   09/11/2023 98 70 - 99 mg/dL Final     Urea Nitrogen   Date Value Ref Range Status   09/11/2023 16.5 6.0 - 20.0 mg/dL Final     Creatinine   Date Value Ref Range Status   09/11/2023 0.67 0.51 - 0.95 mg/dL Final     GFR Estimate   Date Value Ref Range Status   09/11/2023 >90 >60 mL/min/1.73m2 Final     Calcium   Date Value Ref Range Status   09/11/2023 9.4 8.6 - 10.0 mg/dL Final     Bilirubin Total   Date Value Ref Range Status   09/11/2023 0.6 <=1.2 mg/dL Final     Alkaline Phosphatase   Date Value Ref Range Status   09/11/2023 71 35 - 104 U/L Final     ALT   Date Value Ref Range Status   09/11/2023 34 0 - 50 U/L Final     Comment:     Reference intervals for this test were updated on 6/12/2023 to more accurately reflect our healthy population. There may be differences in the flagging of prior results with similar values performed with this method. Interpretation of those prior results can be made in the context of the updated reference intervals.       AST   Date Value Ref Range Status   09/11/2023 25 0 - 45 U/L Final     Comment:     Reference intervals for this test were updated on 6/12/2023 to more accurately reflect our healthy population. There may be differences in the flagging of prior results with similar values performed with this method. Interpretation of those prior results can be made in the context of the updated reference intervals.     Recent Labs   Lab Test 09/11/23  1211   CHOL 203*   HDL 72   *   TRIG 108      Lab Results   Component Value Date    A1C 5.5 09/11/2023

## 2023-11-07 ENCOUNTER — HOSPITAL ENCOUNTER (OUTPATIENT)
Dept: CARDIOLOGY | Facility: CLINIC | Age: 48
Discharge: HOME | End: 2023-11-07
Attending: INTERNAL MEDICINE | Admitting: INTERNAL MEDICINE
Payer: COMMERCIAL

## 2023-11-07 DIAGNOSIS — I1A.0 RESISTANT HYPERTENSION: ICD-10-CM

## 2023-11-07 PROCEDURE — 76770 US EXAM ABDO BACK WALL COMP: CPT | Mod: XU

## 2023-11-07 PROCEDURE — 93975 VASCULAR STUDY: CPT | Mod: 26 | Performed by: INTERNAL MEDICINE

## 2023-11-07 PROCEDURE — 76770 US EXAM ABDO BACK WALL COMP: CPT | Mod: 26 | Performed by: INTERNAL MEDICINE

## 2023-11-10 ENCOUNTER — TELEPHONE (OUTPATIENT)
Dept: CARDIOLOGY | Facility: CLINIC | Age: 48
End: 2023-11-10
Payer: COMMERCIAL

## 2023-11-10 NOTE — RESULT ENCOUNTER NOTE
Results reviewed, please let the patient know that overall findings are reassuring, no evidence of renal artery stenosis.   Follow up as previously planned, thanks!

## 2023-11-10 NOTE — TELEPHONE ENCOUNTER
Message from Dr. Hampton re: US renal  Simón Hampton MD  P Scherer [facility] Heart Team 2  Results reviewed, please let the patient know that overall findings are reassuring, no evidence of renal artery stenosis.  Follow up as previously planned, thanks!      1330 called patient with the results of her US renal as showing normal flow to the kidneys. Patient verbalized understanding and agreed with plan.

## 2024-06-14 ENCOUNTER — TELEPHONE (OUTPATIENT)
Dept: CARDIOLOGY | Facility: CLINIC | Age: 49
End: 2024-06-14
Payer: COMMERCIAL

## 2024-06-14 DIAGNOSIS — I10 ESSENTIAL HYPERTENSION, BENIGN: ICD-10-CM

## 2024-06-14 RX ORDER — SPIRONOLACTONE 25 MG/1
25 TABLET ORAL DAILY
Qty: 90 TABLET | Refills: 1 | Status: SHIPPED | OUTPATIENT
Start: 2024-06-14 | End: 2024-09-24

## 2024-06-14 RX ORDER — IRBESARTAN 300 MG/1
300 TABLET ORAL DAILY
Qty: 90 TABLET | Refills: 1 | Status: SHIPPED | OUTPATIENT
Start: 2024-06-14

## 2024-06-14 RX ORDER — AMLODIPINE BESYLATE 10 MG/1
10 TABLET ORAL DAILY
Qty: 90 TABLET | Refills: 1 | Status: SHIPPED | OUTPATIENT
Start: 2024-06-14

## 2024-06-14 NOTE — TELEPHONE ENCOUNTER
Spoke with patient to review her fall OV and testing. She will call the [facility] to move her labs to prior to the visit and add the echo.  Refills sent as patient is searching for a new internist. [facility] Cardiology Refill Guideline reviewed.  Medication meets criteria for refill.

## 2024-06-14 NOTE — TELEPHONE ENCOUNTER
Health Call Center    Phone Message    May a detailed message be left on voicemail: yes    Reason for Call: Medication Refill Request    Has the patient contacted the pharmacy for the refill? Yes   Name of medication being requested:   spironolactone (ALDACTONE) 25 MG tablet  irbesartan (AVAPRO) 300 MG tablet  amLODIPine (NORVASC) 10 MG tablet  Provider who prescribed the medication: Tere  Pharmacy: [id number] - [facility] [address] [address] AT [place] [address] & [facility]   Date medication is needed: 6/14/24    Reason for Call: Other: Patient called requesting to speak with a member of her care team in regards to setting up a possible cardiac scan. Please call back to further discuss.             Action Taken: Message routed to:  Other: Cardiology    Travel Screening: Not Applicable     Thank you!  [facility]

## 2024-09-10 ENCOUNTER — TELEPHONE (OUTPATIENT)
Dept: CARDIOLOGY | Facility: CLINIC | Age: 49
End: 2024-09-10
Payer: COMMERCIAL

## 2024-09-10 NOTE — TELEPHONE ENCOUNTER
Patient wants to be sure that the echo is approved and that she doesn't need a PA. Echo is scheduled for 9/20/2024.   Patient states she struggles to get testing approved through her insurance and wants to be sure it is not denied after the fact.    Reviewed with patient that the approval worksheet has not been started yet. Will check in a couple of days to be sure the test is in the que.

## 2024-09-10 NOTE — TELEPHONE ENCOUNTER
[facility] Call Center    Phone Message    May a detailed message be left on voicemail: yes    Reason for Call: Other: Patient called requesting to speak with a member of her care team in regards to her upcoming echo. Would like to discuss the pre-authorization aspect. Please call back to address.     Action Taken: Message routed to:  Other: Cardiology    Travel Screening: Not Applicable     Thank you!  [facility]

## 2024-09-16 ENCOUNTER — TELEPHONE (OUTPATIENT)
Dept: CARDIOLOGY | Facility: CLINIC | Age: 49
End: 2024-09-16

## 2024-09-16 NOTE — TELEPHONE ENCOUNTER
Angie Oviedo Kelly J; Maritza Cohen RN  Cc: KARLA Scherer [facility] [[email or web address]] Heart Team 2  Caller: Unspecified (Today,  1:23 PM)  Hello,    Per the [employer] Portal a Prior Authorization is not required.    Submit Clinical Request  This member's plan does not currently require notification or prior-authorization through the [facility] Notification or Prior-Authorization Program. Please contact a  at [phone] if you believe the information returned to be in error.     Please print and retain a copy of this confirmation in the patient's medical record.     Physician Name: Dr. WHITNEY [facility] Contact: angie  Physician Address: [address] [place], [place] [address] Phone Number: [phone]  Fax Number: [phone]  Patient Name: GONZALO RAZO Patient Id: [id number]  Insurance Carrier: [facility]  Primary Diagnosis Code: I77.810 Description: Thoracic aortic ectasia  Secondary Diagnosis Code: I1A.0 Description: Resistant hypertension  CPT Code 43167 Description: ECHO, Complete with Doppler  Case Number: [id number]  Review Date: 9/16/2024 2:55:57 PM  Expiration Date: N/A      1400 left a message for patient with update that her echo plan was reviewed/verified by the financial team.

## 2024-09-16 NOTE — TELEPHONE ENCOUNTER
Sent update to p [facility]  auth team to be sure that echo on 9/20/2024 is covered.    Patient wants a call with update as she is concerned it won't be covered.

## 2024-09-20 ENCOUNTER — LAB (OUTPATIENT)
Dept: LAB | Facility: CLINIC | Age: 49
End: 2024-09-20
Payer: COMMERCIAL

## 2024-09-20 ENCOUNTER — HOSPITAL ENCOUNTER (OUTPATIENT)
Dept: CARDIOLOGY | Facility: CLINIC | Age: 49
Discharge: HOME | End: 2024-09-20
Attending: INTERNAL MEDICINE | Admitting: INTERNAL MEDICINE
Payer: COMMERCIAL

## 2024-09-20 DIAGNOSIS — E78.5 DYSLIPIDEMIA: ICD-10-CM

## 2024-09-20 DIAGNOSIS — I1A.0 RESISTANT HYPERTENSION: ICD-10-CM

## 2024-09-20 DIAGNOSIS — I77.810 DILATATION OF THORACIC AORTA (H): ICD-10-CM

## 2024-09-20 LAB
ALT SERPL W P-5'-P-CCNC: 30 U/L (ref 0–50)
ANION GAP SERPL CALCULATED.3IONS-SCNC: 11 MMOL/L (ref 7–15)
BUN SERPL-MCNC: 14.5 MG/DL (ref 6–20)
CALCIUM SERPL-MCNC: 9.3 MG/DL (ref 8.8–10.4)
CHLORIDE SERPL-SCNC: 102 MMOL/L (ref 98–107)
CHOLEST SERPL-MCNC: 205 MG/DL
CREAT SERPL-MCNC: 0.83 MG/DL (ref 0.51–0.95)
EGFRCR SERPLBLD CKD-EPI 2021: 85 ML/MIN/1.73M2
FASTING STATUS PATIENT QL REPORTED: YES
GLUCOSE SERPL-MCNC: 95 MG/DL (ref 70–99)
HCO3 SERPL-SCNC: 25 MMOL/L (ref 22–29)
HDLC SERPL-MCNC: 53 MG/DL
LDLC SERPL CALC-MCNC: 112 MG/DL
LDLC SERPL DIRECT ASSAY-MCNC: 108 MG/DL
LVEF ECHO: NORMAL
NONHDLC SERPL-MCNC: 152 MG/DL
POTASSIUM SERPL-SCNC: 4.4 MMOL/L (ref 3.4–5.3)
SODIUM SERPL-SCNC: 138 MMOL/L (ref 135–145)
TRIGL SERPL-MCNC: 202 MG/DL

## 2024-09-20 PROCEDURE — 83721 ASSAY OF BLOOD LIPOPROTEIN: CPT

## 2024-09-20 PROCEDURE — 93306 TTE W/DOPPLER COMPLETE: CPT | Mod: 26 | Performed by: INTERNAL MEDICINE

## 2024-09-20 PROCEDURE — 36415 COLL VENOUS BLD VENIPUNCTURE: CPT

## 2024-09-20 PROCEDURE — 80048 BASIC METABOLIC PNL TOTAL CA: CPT

## 2024-09-20 PROCEDURE — 80061 LIPID PANEL: CPT

## 2024-09-20 PROCEDURE — 93306 TTE W/DOPPLER COMPLETE: CPT

## 2024-09-20 PROCEDURE — 84460 ALANINE AMINO (ALT) (SGPT): CPT

## 2024-09-24 ENCOUNTER — OFFICE VISIT (OUTPATIENT)
Dept: CARDIOLOGY | Facility: CLINIC | Age: 49
End: 2024-09-24
Payer: COMMERCIAL

## 2024-09-24 VITALS
HEIGHT: 65 IN | WEIGHT: 174 LBS | BODY MASS INDEX: 28.99 KG/M2 | HEART RATE: 91 BPM | SYSTOLIC BLOOD PRESSURE: 110 MMHG | DIASTOLIC BLOOD PRESSURE: 80 MMHG

## 2024-09-24 DIAGNOSIS — I10 ESSENTIAL HYPERTENSION, BENIGN: ICD-10-CM

## 2024-09-24 DIAGNOSIS — E78.5 DYSLIPIDEMIA: Primary | ICD-10-CM

## 2024-09-24 PROCEDURE — 99215 OFFICE O/P EST HI 40 MIN: CPT | Performed by: INTERNAL MEDICINE

## 2024-09-24 RX ORDER — FUROSEMIDE 20 MG
10 TABLET ORAL DAILY
Qty: 45 TABLET | Refills: 3 | Status: SHIPPED | OUTPATIENT
Start: 2024-09-24

## 2024-09-24 RX ORDER — SPIRONOLACTONE 50 MG/1
50 TABLET, FILM COATED ORAL DAILY
Qty: 90 TABLET | Refills: 3 | Status: SHIPPED | OUTPATIENT
Start: 2024-09-24

## 2024-09-24 NOTE — LETTER
9/24/2024    Physician No Ref-Primary  No address on file    RE: Nannette Lisa       Dear Colleague,     I had the pleasure of seeing Nannette Lisa in the [facility].      Cardiology Clinic Progress Note:    September 24, 2024   Patient Name: Nannette Lisa  Patient MRN: [id number]     Consult indication: HTN    HPI:    As you know patient is a pleasant 48-year-old female with a past medical history significant for hypertension, asthma, family history of early ASCVD, family history of aortic aneurysm, who presents for follow-up.    Last clinic visit 10/27/2023, we obtained renal artery Doppler ultrasounds which were negative for evidence of renal artery stenosis.  Etiology of hypertension likely familial.  A TTE was done 9/20/2024 that demonstrated normal cardiac structure and function, ascending aorta 37 mm.    Patient has been followed closely by her PCP, antihypertensive regimen has been adjusted due to side effect such as ankle swelling with amlodipine.  Currently she is on amlodipine 10 mg daily, spironolactone was increased to 50 mg daily to address the leg swelling, also on irbesartan 300 mg daily.  Patient still notes swelling in her feet/ankles, though this has improved mildly with the increase in spironolactone dosage.  BP at home generally in the 120 over 70s mmHg range more recently.    Patient is very physically active.  She engages in a stairstep exercise regimen, also aerobic exercise.  No exertional chest pain/chest pressure.     Reviewed recent lipids from 9/2024, total cholesterol 205, HDL 53, , triglycerides 202.    She and her  have been consuming a ginna about 4 times a week, she also reports indulging in more cheese lately in her diet.    Assessment and Plan/Recommendations:    # Hypertension  # Ankle/feet swelling, consistent with amlodipine side effect  # FH of ascending aorta aneurysm, normal size aorta on TTE 9/2024  # FH of early ASCVD  #  Dyslipidemia, mild, not meeting criteria for statin therapy     - Encouraged continued healthy lifestyle habits including regular aerobic exercise, cutting back on excess fat/salt in diet, cutting back on alcohol as able  - Will start low-dose furosemide 10 mg daily since it seems that  amlodipine has been uniquely effective for blood pressure control  - Continue remainder of cardiac regimen including irbesartan, spironolactone  - BMP in about 2 weeks  - Lipid panel in 3 months  -Advised to continue monitoring blood pressures at home, may need to decrease amlodipine dosage if blood pressures decrease with optimization of diet  - Recommend repeat TTE every 3 years for reassessment of the ascending aorta given FH, can be done through PCP   - Follow-up in 1 month with cardiology JAIME, if stable at that time, scheduled follow-up in cardiology clinic would not be required however we would be glad to see her back in the future as needed      Thank you for allowing our team to participate in the care of Nannette Lisa.  Please do not hesitate to call or page me with any questions or concerns.    Sincerely,     Simón Hampton MD, [facility]  Cardiology  Text Page   September 24, 2024    Voice recognition software utilized.     Total time spent on this encounter today: Greater than 40 minutes, providing care in this encounter including, but not limited to, reviewing prior medical records, laboratory data, imaging studies, diagnostic studies, procedure notes, formulating an assessment and plan, recommendations, discussion and counseling with patient face to face, dictation.    Past Medical History:       Past Surgical History:   Past Surgical History:   Procedure Laterality Date     HYSTERECTOMY, PAP NO LONGER INDICATED         Medications (outpatient):  Current Outpatient Medications   Medication Sig Dispense Refill     albuterol (PROAIR HFA/PROVENTIL HFA/VENTOLIN HFA) 108 (90 Base) MCG/ACT inhaler Inhale 2 puffs  "into the lungs every 6 hours as needed for shortness of breath, wheezing or cough 18 g 11     amLODIPine (NORVASC) 10 MG tablet Take 1 tablet (10 mg) by mouth daily 90 tablet 1     irbesartan (AVAPRO) 300 MG tablet Take 1 tablet (300 mg) by mouth daily 90 tablet 1     mometasone (ASMANEX TWISTHALER) 220 MCG/ACT inhaler Inhale into the lungs every evening.       omeprazole (PRILOSEC) 20 MG DR capsule Take 20 mg by mouth daily       spironolactone (ALDACTONE) 25 MG tablet Take 1 tablet (25 mg) by mouth daily (Patient taking differently: Take 50 mg by mouth daily.) 90 tablet 1     fluticasone (ARNUITY ELLIPTA) 100 MCG/ACT inhaler Inhale 1 puff into the lungs daily 30 each 11       Allergies:  Allergies   Allergen Reactions     Amoxicillin-Pot Clavulanate Diarrhea     Beta Adrenergic Blockers      Fatigue at higher doses     Diltiazem Dizziness     vertigo     Lisinopril      \"felt funny\"       Social History:   History   Drug Use Unknown      History   Smoking Status     Never   Smokeless Tobacco     Never     Social History    Substance and Sexual Activity      Alcohol use: No       Family History:  Family History   Problem Relation Age of Onset     Aortic aneurysm Mother      Atrial fibrillation Mother      Ulcerative Colitis Mother      Coronary Artery Disease Father      Cancer Father        Review of Systems:   A complete review of systems was negative except as mentioned in the History of Present Illness.     Objective & Physical Exam:  /80   Pulse 91   Ht 1.651 m (5' 5\")   Wt 78.9 kg (174 lb)   BMI 28.96 kg/m    Wt Readings from Last 2 Encounters:   09/24/24 78.9 kg (174 lb)   10/27/23 77.6 kg (171 lb)     Body mass index is 28.96 kg/m .   Body surface area is 1.9 meters squared.    Constitutional: appears stated age, in no apparent distress, appears to be well nourished  Pulmonary: clear to auscultation bilaterally, no wheezes, no rales, no increased work of breathing  Cardiovascular: JVP normal, " regular rate, regular rhythm, no murmur appreciated, no lower extremity edema      Data reviewed:  Lab Results   Component Value Date    WBC 9.6 09/11/2023    RBC 4.62 09/11/2023    HGB 14.2 09/11/2023    HCT 42.2 09/11/2023    MCV 91 09/11/2023    MCH 30.7 09/11/2023    MCHC 33.6 09/11/2023    RDW 13.1 09/11/2023     09/11/2023     Sodium   Date Value Ref Range Status   09/20/2024 138 135 - 145 mmol/L Final     Potassium   Date Value Ref Range Status   09/20/2024 4.4 3.4 - 5.3 mmol/L Final     Chloride   Date Value Ref Range Status   09/20/2024 102 98 - 107 mmol/L Final     Carbon Dioxide (CO2)   Date Value Ref Range Status   09/20/2024 25 22 - 29 mmol/L Final     Anion Gap   Date Value Ref Range Status   09/20/2024 11 7 - 15 mmol/L Final     Glucose   Date Value Ref Range Status   09/20/2024 95 70 - 99 mg/dL Final     Urea Nitrogen   Date Value Ref Range Status   09/20/2024 14.5 6.0 - 20.0 mg/dL Final     Creatinine   Date Value Ref Range Status   09/20/2024 0.83 0.51 - 0.95 mg/dL Final     GFR Estimate   Date Value Ref Range Status   09/20/2024 85 >60 mL/min/1.73m2 Final     Comment:     eGFR calculated using 2021 CKD-EPI equation.     Calcium   Date Value Ref Range Status   09/20/2024 9.3 8.8 - 10.4 mg/dL Final     Comment:     Reference intervals for this test were updated on 7/16/2024 to reflect our healthy population more accurately. There may be differences in the flagging of prior results with similar values performed with this method. Those prior results can be interpreted in the context of the updated reference intervals.     Bilirubin Total   Date Value Ref Range Status   09/11/2023 0.6 <=1.2 mg/dL Final     Alkaline Phosphatase   Date Value Ref Range Status   09/11/2023 71 35 - 104 U/L Final     ALT   Date Value Ref Range Status   09/20/2024 30 0 - 50 U/L Final     AST   Date Value Ref Range Status   09/11/2023 25 0 - 45 U/L Final     Comment:     Reference intervals for this test were updated on  2023 to more accurately reflect our healthy population. There may be differences in the flagging of prior results with similar values performed with this method. Interpretation of those prior results can be made in the context of the updated reference intervals.     Recent Labs   Lab Test 24  1029 23  1211   CHOL 205* 203*   HDL 53 72   *  108* 109*   TRIG 202* 108      Lab Results   Component Value Date    A1C 5.5 2023        Recent Results (from the past 4320 hour(s))   Echocardiogram Complete   Result Value    LVEF  55-60%    [facility]    [id number]  [id number]  [id number]  [id number]^LEILA^AIDEE^YOVANA     [facility]  Echocardiography Laboratory  [address] [place] [address]     Name: GONZALO RAZO  MRN: [id number]  : 1975  Study Date: 2024 12:29 PM  Age: 49 yrs  Gender: Female  Patient Location: [facility]  Reason For Study: Dilatation of thoracic aorta (H24), Resistant hypertension  Ordering Physician: AIDEE DEE  Referring Physician: AIDEE DEE  Performed By: Ranjan Rodriges RDCS     BSA: 1.9 m2  Height: 65 in  Weight: 171 lb  HR: 79  BP: 143/83 mmHg  ______________________________________________________________________________  Procedure  Complete Echo Adult.  ______________________________________________________________________________  Interpretation Summary     The visual ejection fraction is 55-60%.  The left ventricle is normal in size.  The right ventricle is normal in structure, function and size.  The ascending aorta measures 37 mm which is within normal limits     No old study for comparison  ______________________________________________________________________________  Left Ventricle  The left ventricle is normal in size. There is normal left ventricular wall  thickness. Left ventricular systolic function is normal. The visual ejection  fraction is 55-60%. No regional wall motion abnormalities noted. There is no  thrombus seen in the left  ventricle.     Right Ventricle  The right ventricle is normal in structure, function and size. There is no  mass or thrombus in the right ventricle.     Atria  Normal left atrial size. Right atrial size is normal. There is no atrial shunt  seen. The left atrial appendage is not well visualized.     Mitral Valve  The mitral valve leaflets appear normal. There is no evidence of stenosis,  fluttering, or prolapse. There is no mitral regurgitation noted. There is no  mitral valve stenosis.     Tricuspid Valve  Normal tricuspid valve. No tricuspid regurgitation. There is no tricuspid  stenosis.     Aortic Valve  The aortic valve is trileaflet. No aortic regurgitation is present. No aortic  stenosis is present.     Pulmonic Valve  Normal pulmonic valve. There is no pulmonic valvular regurgitation. There is  no pulmonic valvular stenosis.     Vessels  The aortic root is normal size. Normal size ascending aorta. The inferior vena  cava is normal. The pulmonary artery is normal size.     Pericardium  The pericardium appears normal. There is no pleural effusion.     Rhythm  Sinus rhythm was noted.  ______________________________________________________________________________  MMode/2D Measurements & Calculations  IVSd: 0.98 cm     LVIDd: 4.8 cm  LVIDs: 2.7 cm  LVPWd: 1.0 cm  IVC diam: 1.3 cm  FS: 43.0 %  LV mass(C)d: 166.3 grams  LV mass(C)dI: 89.9 grams/m2  Ao root diam: 3.6 cm  asc Aorta Diam: 3.7 cm  LVOT diam: 1.8 cm  LVOT area: 2.5 cm2  Ao root diam index Ht(cm/m): 2.2  Ao root diam index BSA (cm/m2): 1.9  Asc Ao diam index BSA (cm/m2): 2.0  Asc Ao diam index Ht(cm/m): 2.2  LA Volume (BP): 43.7 ml     LA Volume Index (BP): 23.6 ml/m2  RWT: 0.42  TAPSE: 2.1 cm     Time Measurements  Aortic HR: 70.0 BPM     Doppler Measurements & Calculations  MV E max deidre: 83.0 cm/sec  MV A max deidre: 86.2 cm/sec  MV E/A: 0.96  MV max P.7 mmHg  MV mean P.7 mmHg  MV V2 VTI: 23.6 cm  MVA(VTI): 3.1 cm2  MV P1/2t max deidre: 82.7  cm/sec  MV P1/2t: 98.8 msec  MVA(P1/2t): 2.2 cm2  MV dec slope: 245.1 cm/sec2  MV dec time: 0.23 sec  LV V1 max P.2 mmHg  LV V1 max: 167.0 cm/sec  LV V1 VTI: 29.2 cm  CO(LVOT): 5.1 l/min  CI(LVOT): 2.7 l/min/m2  SV(LVOT): 72.2 ml  SI(LVOT): 39.0 ml/m2  PA acc time: 0.12 sec  E/E' av.8     Lateral E/e': 6.0  Medial E/e': 7.6  RV S Royal: 15.3 cm/sec     ______________________________________________________________________________  Report approved by: Dr. Luisito Saenz 2024 01:56 PM                Thank you for allowing me to participate in the care of your patient.      Sincerely,     Simón Hampton MD     [facility]  cc:   No referring provider defined for this encounter.

## 2024-09-24 NOTE — PROGRESS NOTES
Cardiology Clinic Progress Note:    September 24, 2024   Patient Name: Nannette Lisa  Patient MRN: [id number]     Consult indication: HTN    HPI:    As you know patient is a pleasant 48-year-old female with a past medical history significant for hypertension, asthma, family history of early ASCVD, family history of aortic aneurysm, who presents for follow-up.    Last clinic visit 10/27/2023, we obtained renal artery Doppler ultrasounds which were negative for evidence of renal artery stenosis.  Etiology of hypertension likely familial.  A TTE was done 9/20/2024 that demonstrated normal cardiac structure and function, ascending aorta 37 mm.    Patient has been followed closely by her PCP, antihypertensive regimen has been adjusted due to side effect such as ankle swelling with amlodipine.  Currently she is on amlodipine 10 mg daily, spironolactone was increased to 50 mg daily to address the leg swelling, also on irbesartan 300 mg daily.  Patient still notes swelling in her feet/ankles, though this has improved mildly with the increase in spironolactone dosage.  BP at home generally in the 120 over 70s mmHg range more recently.    Patient is very physically active.  She engages in a stairstep exercise regimen, also aerobic exercise.  No exertional chest pain/chest pressure.     Reviewed recent lipids from 9/2024, total cholesterol 205, HDL 53, , triglycerides 202.    She and her  have been consuming a ginna about 4 times a week, she also reports indulging in more cheese lately in her diet.    Assessment and Plan/Recommendations:    # Hypertension  # Ankle/feet swelling, consistent with amlodipine side effect  # FH of ascending aorta aneurysm, normal size aorta on TTE 9/2024  # FH of early ASCVD  # Dyslipidemia, mild, not meeting criteria for statin therapy     - Encouraged continued healthy lifestyle habits including regular aerobic exercise, cutting back on excess fat/salt in diet, cutting  back on alcohol as able  - Will start low-dose furosemide 10 mg daily since it seems that  amlodipine has been uniquely effective for blood pressure control  - Continue remainder of cardiac regimen including irbesartan, spironolactone  - BMP in about 2 weeks  - Lipid panel in 3 months  -Advised to continue monitoring blood pressures at home, may need to decrease amlodipine dosage if blood pressures decrease with optimization of diet  - Recommend repeat TTE every 3 years for reassessment of the ascending aorta given FH, can be done through PCP   - Follow-up in 1 month with cardiology JAIME, if stable at that time, scheduled follow-up in cardiology clinic would not be required however we would be glad to see her back in the future as needed      Thank you for allowing our team to participate in the care of Nannette YOVANA Lisa.  Please do not hesitate to call or page me with any questions or concerns.    Sincerely,     Simón Hampton MD, [facility]  Cardiology  Text Page   September 24, 2024    Voice recognition software utilized.     Total time spent on this encounter today: Greater than 40 minutes, providing care in this encounter including, but not limited to, reviewing prior medical records, laboratory data, imaging studies, diagnostic studies, procedure notes, formulating an assessment and plan, recommendations, discussion and counseling with patient face to face, dictation.    Past Medical History:       Past Surgical History:   Past Surgical History:   Procedure Laterality Date    HYSTERECTOMY, PAP NO LONGER INDICATED         Medications (outpatient):  Current Outpatient Medications   Medication Sig Dispense Refill    albuterol (PROAIR HFA/PROVENTIL HFA/VENTOLIN HFA) 108 (90 Base) MCG/ACT inhaler Inhale 2 puffs into the lungs every 6 hours as needed for shortness of breath, wheezing or cough 18 g 11    amLODIPine (NORVASC) 10 MG tablet Take 1 tablet (10 mg) by mouth daily 90 tablet 1    irbesartan (AVAPRO)  "300 MG tablet Take 1 tablet (300 mg) by mouth daily 90 tablet 1    mometasone (ASMANEX TWISTHALER) 220 MCG/ACT inhaler Inhale into the lungs every evening.      omeprazole (PRILOSEC) 20 MG DR capsule Take 20 mg by mouth daily      spironolactone (ALDACTONE) 25 MG tablet Take 1 tablet (25 mg) by mouth daily (Patient taking differently: Take 50 mg by mouth daily.) 90 tablet 1    fluticasone (ARNUITY ELLIPTA) 100 MCG/ACT inhaler Inhale 1 puff into the lungs daily 30 each 11       Allergies:  Allergies   Allergen Reactions    Amoxicillin-Pot Clavulanate Diarrhea    Beta Adrenergic Blockers      Fatigue at higher doses    Diltiazem Dizziness     vertigo    Lisinopril      \"felt funny\"       Social History:   History   Drug Use Unknown      History   Smoking Status    Never   Smokeless Tobacco    Never     Social History    Substance and Sexual Activity      Alcohol use: No       Family History:  Family History   Problem Relation Age of Onset    Aortic aneurysm Mother     Atrial fibrillation Mother     Ulcerative Colitis Mother     Coronary Artery Disease Father     Cancer Father        Review of Systems:   A complete review of systems was negative except as mentioned in the History of Present Illness.     Objective & Physical Exam:  /80   Pulse 91   Ht 1.651 m (5' 5\")   Wt 78.9 kg (174 lb)   BMI 28.96 kg/m    Wt Readings from Last 2 Encounters:   09/24/24 78.9 kg (174 lb)   10/27/23 77.6 kg (171 lb)     Body mass index is 28.96 kg/m .   Body surface area is 1.9 meters squared.    Constitutional: appears stated age, in no apparent distress, appears to be well nourished  Pulmonary: clear to auscultation bilaterally, no wheezes, no rales, no increased work of breathing  Cardiovascular: JVP normal, regular rate, regular rhythm, no murmur appreciated, no lower extremity edema      Data reviewed:  Lab Results   Component Value Date    WBC 9.6 09/11/2023    RBC 4.62 09/11/2023    HGB 14.2 09/11/2023    HCT 42.2 " 09/11/2023    MCV 91 09/11/2023    MCH 30.7 09/11/2023    MCHC 33.6 09/11/2023    RDW 13.1 09/11/2023     09/11/2023     Sodium   Date Value Ref Range Status   09/20/2024 138 135 - 145 mmol/L Final     Potassium   Date Value Ref Range Status   09/20/2024 4.4 3.4 - 5.3 mmol/L Final     Chloride   Date Value Ref Range Status   09/20/2024 102 98 - 107 mmol/L Final     Carbon Dioxide (CO2)   Date Value Ref Range Status   09/20/2024 25 22 - 29 mmol/L Final     Anion Gap   Date Value Ref Range Status   09/20/2024 11 7 - 15 mmol/L Final     Glucose   Date Value Ref Range Status   09/20/2024 95 70 - 99 mg/dL Final     Urea Nitrogen   Date Value Ref Range Status   09/20/2024 14.5 6.0 - 20.0 mg/dL Final     Creatinine   Date Value Ref Range Status   09/20/2024 0.83 0.51 - 0.95 mg/dL Final     GFR Estimate   Date Value Ref Range Status   09/20/2024 85 >60 mL/min/1.73m2 Final     Comment:     eGFR calculated using 2021 CKD-EPI equation.     Calcium   Date Value Ref Range Status   09/20/2024 9.3 8.8 - 10.4 mg/dL Final     Comment:     Reference intervals for this test were updated on 7/16/2024 to reflect our healthy population more accurately. There may be differences in the flagging of prior results with similar values performed with this method. Those prior results can be interpreted in the context of the updated reference intervals.     Bilirubin Total   Date Value Ref Range Status   09/11/2023 0.6 <=1.2 mg/dL Final     Alkaline Phosphatase   Date Value Ref Range Status   09/11/2023 71 35 - 104 U/L Final     ALT   Date Value Ref Range Status   09/20/2024 30 0 - 50 U/L Final     AST   Date Value Ref Range Status   09/11/2023 25 0 - 45 U/L Final     Comment:     Reference intervals for this test were updated on 6/12/2023 to more accurately reflect our healthy population. There may be differences in the flagging of prior results with similar values performed with this method. Interpretation of those prior results can be  made in the context of the updated reference intervals.     Recent Labs   Lab Test 24  1029 23  1211   CHOL 205* 203*   HDL 53 72   *  108* 109*   TRIG 202* 108      Lab Results   Component Value Date    A1C 5.5 2023        Recent Results (from the past 4320 hour(s))   Echocardiogram Complete   Result Value    LVEF  55-60%    Narrative    [id number]  [id number]  [id number]  [id number]^LEILA^AIDEE^YOVANA     [facility]  Echocardiography Laboratory  [address] [place] [address]     Name: GONZALO RAZO  MRN: [id number]  : 1975  Study Date: 2024 12:29 PM  Age: 49 yrs  Gender: Female  Patient [facility]: [facility]  Reason For Study: Dilatation of thoracic aorta (H24), Resistant hypertension  Ordering Physician: AIDEE DEE  Referring Physician: AIDEE DEE  Performed By: Ranjan Rodriges RDCS     BSA: 1.9 m2  Height: 65 in  Weight: 171 lb  HR: 79  BP: 143/83 mmHg  ______________________________________________________________________________  Procedure  Complete Echo Adult.  ______________________________________________________________________________  Interpretation Summary     The visual ejection fraction is 55-60%.  The left ventricle is normal in size.  The right ventricle is normal in structure, function and size.  The ascending aorta measures 37 mm which is within normal limits     No old study for comparison  ______________________________________________________________________________  Left Ventricle  The left ventricle is normal in size. There is normal left ventricular wall  thickness. Left ventricular systolic function is normal. The visual ejection  fraction is 55-60%. No regional wall motion abnormalities noted. There is no  thrombus seen in the left ventricle.     Right Ventricle  The right ventricle is normal in structure, function and size. There is no  mass or thrombus in the right ventricle.     Atria  Normal left atrial size. Right atrial size is normal.  There is no atrial shunt  seen. The left atrial appendage is not well visualized.     Mitral Valve  The mitral valve leaflets appear normal. There is no evidence of stenosis,  fluttering, or prolapse. There is no mitral regurgitation noted. There is no  mitral valve stenosis.     Tricuspid Valve  Normal tricuspid valve. No tricuspid regurgitation. There is no tricuspid  stenosis.     Aortic Valve  The aortic valve is trileaflet. No aortic regurgitation is present. No aortic  stenosis is present.     Pulmonic Valve  Normal pulmonic valve. There is no pulmonic valvular regurgitation. There is  no pulmonic valvular stenosis.     Vessels  The aortic root is normal size. Normal size ascending aorta. The inferior vena  cava is normal. The pulmonary artery is normal size.     Pericardium  The pericardium appears normal. There is no pleural effusion.     Rhythm  Sinus rhythm was noted.  ______________________________________________________________________________  MMode/2D Measurements & Calculations  IVSd: 0.98 cm     LVIDd: 4.8 cm  LVIDs: 2.7 cm  LVPWd: 1.0 cm  IVC diam: 1.3 cm  FS: 43.0 %  LV mass(C)d: 166.3 grams  LV mass(C)dI: 89.9 grams/m2  Ao root diam: 3.6 cm  asc Aorta Diam: 3.7 cm  LVOT diam: 1.8 cm  LVOT area: 2.5 cm2  Ao root diam index Ht(cm/m): 2.2  Ao root diam index BSA (cm/m2): 1.9  Asc Ao diam index BSA (cm/m2): 2.0  Asc Ao diam index Ht(cm/m): 2.2  LA Volume (BP): 43.7 ml     LA Volume Index (BP): 23.6 ml/m2  RWT: 0.42  TAPSE: 2.1 cm     Time Measurements  Aortic HR: 70.0 BPM     Doppler Measurements & Calculations  MV E max deidre: 83.0 cm/sec  MV A max deidre: 86.2 cm/sec  MV E/A: 0.96  MV max P.7 mmHg  MV mean P.7 mmHg  MV V2 VTI: 23.6 cm  MVA(VTI): 3.1 cm2  MV P1/2t max deidre: 82.7 cm/sec  MV P1/2t: 98.8 msec  MVA(P1/2t): 2.2 cm2  MV dec slope: 245.1 cm/sec2  MV dec time: 0.23 sec  LV V1 max P.2 mmHg  LV V1 max: 167.0 cm/sec  LV V1 VTI: 29.2 cm  CO(LVOT): 5.1 l/min  CI(LVOT): 2.7  l/min/m2  SV(LVOT): 72.2 ml  SI(LVOT): 39.0 ml/m2  PA acc time: 0.12 sec  E/E' av.8     Lateral E/e': 6.0  Medial E/e': 7.6  RV S Royal: 15.3 cm/sec     ______________________________________________________________________________  Report approved by: Dr. Luisito Saenz 2024 01:56 PM

## 2024-09-24 NOTE — PATIENT INSTRUCTIONS
September 24, 2024    Thank you for allowing our Cardiology team to participate in your care.     Please note the following changes to your heart treatment plan:     Medication changes:   - start furosemide 10mg daily    Tests to be done:  - labs in 2 weeks  - labs in 3 months  - monitor BPs     Follow up:  - Follow up in 1 month with cardiology JAIME      For scheduling, please call [phone].      Please contact our team through [employer] or our Nurse Team Voicemail service [phone], or the General Clinic [phone] for any questions or concerns.     If you are having a medical emergency, please call [phone].     Sincerely,    Simón Hampton MD, [facility]  Cardiology    [facility] - [facility] [place]

## 2024-10-08 ENCOUNTER — LAB (OUTPATIENT)
Dept: LAB | Facility: CLINIC | Age: 49
End: 2024-10-08
Payer: COMMERCIAL

## 2024-10-08 DIAGNOSIS — I10 ESSENTIAL HYPERTENSION, BENIGN: ICD-10-CM

## 2024-10-08 LAB
ANION GAP SERPL CALCULATED.3IONS-SCNC: 15 MMOL/L (ref 7–15)
BUN SERPL-MCNC: 14.6 MG/DL (ref 6–20)
CALCIUM SERPL-MCNC: 9.4 MG/DL (ref 8.8–10.4)
CHLORIDE SERPL-SCNC: 102 MMOL/L (ref 98–107)
CREAT SERPL-MCNC: 0.82 MG/DL (ref 0.51–0.95)
EGFRCR SERPLBLD CKD-EPI 2021: 86 ML/MIN/1.73M2
GLUCOSE SERPL-MCNC: 104 MG/DL (ref 70–99)
HCO3 SERPL-SCNC: 22 MMOL/L (ref 22–29)
POTASSIUM SERPL-SCNC: 4.8 MMOL/L (ref 3.4–5.3)
SODIUM SERPL-SCNC: 139 MMOL/L (ref 135–145)
T4 FREE SERPL-MCNC: 1.09 NG/DL (ref 0.9–1.7)
TSH SERPL DL<=0.005 MIU/L-ACNC: 4.39 UIU/ML (ref 0.3–4.2)

## 2024-10-08 PROCEDURE — 80048 BASIC METABOLIC PNL TOTAL CA: CPT | Performed by: INTERNAL MEDICINE

## 2024-10-08 PROCEDURE — 36415 COLL VENOUS BLD VENIPUNCTURE: CPT | Performed by: INTERNAL MEDICINE

## 2024-10-08 PROCEDURE — 84443 ASSAY THYROID STIM HORMONE: CPT | Performed by: INTERNAL MEDICINE

## 2024-10-08 PROCEDURE — 84439 ASSAY OF FREE THYROXINE: CPT | Performed by: INTERNAL MEDICINE

## 2024-10-09 NOTE — RESULT ENCOUNTER NOTE
Labs reviewed, electrolytes stable, TSH mildly elevated but free T4 is normal, should be monitored periodically by PCP thanks
[employer] message sent to patient. 
Monthly or less

## 2024-10-12 DIAGNOSIS — J40 BRONCHITIS: ICD-10-CM

## 2024-10-14 RX ORDER — ALBUTEROL SULFATE 90 UG/1
INHALANT RESPIRATORY (INHALATION)
Qty: 18 G | Refills: 11 | Status: SHIPPED | OUTPATIENT
Start: 2024-10-14

## 2025-02-24 ENCOUNTER — LAB (OUTPATIENT)
Dept: LAB | Facility: CLINIC | Age: 50
End: 2025-02-24
Payer: COMMERCIAL

## 2025-02-24 DIAGNOSIS — E78.5 DYSLIPIDEMIA: ICD-10-CM

## 2025-02-24 LAB — ALT SERPL W P-5'-P-CCNC: 31 U/L (ref 0–50)

## 2025-02-24 PROCEDURE — 80061 LIPID PANEL: CPT | Performed by: INTERNAL MEDICINE

## 2025-02-24 PROCEDURE — 36415 COLL VENOUS BLD VENIPUNCTURE: CPT | Performed by: INTERNAL MEDICINE

## 2025-02-24 PROCEDURE — 84460 ALANINE AMINO (ALT) (SGPT): CPT | Performed by: INTERNAL MEDICINE

## 2025-02-25 LAB
CHOLEST SERPL-MCNC: 197 MG/DL
FASTING STATUS PATIENT QL REPORTED: YES
HDLC SERPL-MCNC: 64 MG/DL
LDLC SERPL CALC-MCNC: 91 MG/DL
NONHDLC SERPL-MCNC: 133 MG/DL
TRIGL SERPL-MCNC: 211 MG/DL

## 2025-02-25 NOTE — RESULT ENCOUNTER NOTE
Results reviewed, please let the patient know that overall findings are reassuring, LDL decreased from 5 months ago. Recommend continued healthy lifestyle habits thanks

## 2025-04-21 ENCOUNTER — TELEPHONE (OUTPATIENT)
Dept: CARDIOLOGY | Facility: CLINIC | Age: 50
End: 2025-04-21
Payer: COMMERCIAL

## 2025-04-21 DIAGNOSIS — I10 ESSENTIAL HYPERTENSION, BENIGN: ICD-10-CM

## 2025-04-21 RX ORDER — IRBESARTAN 300 MG/1
300 TABLET ORAL DAILY
Qty: 90 TABLET | Refills: 1 | Status: SHIPPED | OUTPATIENT
Start: 2025-04-21